# Patient Record
Sex: FEMALE | Race: BLACK OR AFRICAN AMERICAN | NOT HISPANIC OR LATINO | Employment: FULL TIME | URBAN - METROPOLITAN AREA
[De-identification: names, ages, dates, MRNs, and addresses within clinical notes are randomized per-mention and may not be internally consistent; named-entity substitution may affect disease eponyms.]

---

## 2022-08-11 RX ORDER — METHYLPREDNISOLONE 4 MG/1
4 TABLET ORAL
COMMUNITY
Start: 2022-02-14 | End: 2023-02-14

## 2022-08-11 RX ORDER — AZILSARTAN KAMEDOXOMIL 80 MG/1
80 TABLET ORAL DAILY
COMMUNITY
Start: 2022-02-15

## 2022-08-11 RX ORDER — POTASSIUM CHLORIDE 750 MG/1
30 TABLET, EXTENDED RELEASE ORAL 2 TIMES DAILY
COMMUNITY
Start: 2022-02-14 | End: 2023-02-14

## 2022-08-11 RX ORDER — HYDRALAZINE HYDROCHLORIDE 25 MG/1
25 TABLET, FILM COATED ORAL 2 TIMES DAILY
COMMUNITY
Start: 2022-02-14

## 2022-08-11 RX ORDER — CHLORTHALIDONE 25 MG/1
25 TABLET ORAL DAILY
COMMUNITY
Start: 2022-02-14

## 2022-08-11 RX ORDER — CARVEDILOL 12.5 MG/1
12.5 TABLET ORAL 2 TIMES DAILY
COMMUNITY
Start: 2022-02-14

## 2022-08-11 RX ORDER — FUROSEMIDE 20 MG/1
20 TABLET ORAL DAILY
COMMUNITY
Start: 2022-05-17

## 2022-08-11 RX ORDER — CETIRIZINE HYDROCHLORIDE 10 MG/1
10 TABLET ORAL DAILY
COMMUNITY
Start: 2022-03-24 | End: 2023-09-12 | Stop reason: ALTCHOICE

## 2022-08-11 RX ORDER — ALBUTEROL SULFATE 90 UG/1
2 AEROSOL, METERED RESPIRATORY (INHALATION) EVERY 4 HOURS PRN
COMMUNITY
Start: 2022-02-14

## 2022-08-11 RX ORDER — BUDESONIDE AND FORMOTEROL FUMARATE DIHYDRATE 80; 4.5 UG/1; UG/1
2 AEROSOL RESPIRATORY (INHALATION)
COMMUNITY
Start: 2022-02-14

## 2022-08-11 RX ORDER — ALLOPURINOL 100 MG/1
2 TABLET ORAL DAILY
COMMUNITY
Start: 2022-02-14

## 2022-08-11 RX ORDER — ACETAMINOPHEN 500 MG
2000 TABLET ORAL DAILY
COMMUNITY

## 2022-08-30 DIAGNOSIS — N18.31 STAGE 3A CHRONIC KIDNEY DISEASE: Primary | ICD-10-CM

## 2022-09-08 PROBLEM — N18.2 CKD (CHRONIC KIDNEY DISEASE) STAGE 2, GFR 60-89 ML/MIN: Status: ACTIVE | Noted: 2022-09-08

## 2022-09-08 PROBLEM — N25.81 SECONDARY HYPERPARATHYROIDISM OF RENAL ORIGIN: Status: ACTIVE | Noted: 2022-09-08

## 2022-09-08 PROBLEM — I10 ESSENTIAL HYPERTENSION: Status: ACTIVE | Noted: 2022-09-08

## 2022-09-08 PROBLEM — M1A.0720 IDIOPATHIC CHRONIC GOUT OF LEFT FOOT WITHOUT TOPHUS: Status: ACTIVE | Noted: 2022-09-08

## 2022-09-08 NOTE — PROGRESS NOTES
Pt Name:  Michelle Collins  Pt :  1960  Pt MRN:  [id number]      Date:  2022      Reason for visit:     Follow up visit for Chronic Kidney Disease.    Serum creatinine 1.10, GFR 62, CKD stage 2.    Chief Complaint:     The patient denies any complaints today.      Assessment & Plan:      Problem #1.  Essential Hypertension    Assessment:     Reasonably controlled   Plan:     2 g sodium diet, azilsartan 80 mg by mouth daily, carvedilol 12.5 mg by mouth twice daily, furosemide 20 mg by mouth daily, hydralazine 25 mg by mouth twice daily.     Problem #2.   Idiopathic Gout    Assessment:     Asymptomatic   Plan:     Allopurinol 100 mg by mouth daily.     Problem #3.  Chronic Kidney Disease Stage 2    Assessment:     Stable at asymptomatic   Plan:     Conservative, non-dialysis, nephrologic managements.    Continue renal nutrition and fluid managements.    Continue to provide the patient with verbal instruction and reading material regarding the chronic kidney disease condition, and especially its associated cardiovascular risks.    Return for follow-up in 6 months with repeat kidney lab work done before the visit.     Problem #4.  Hypokalemia    Assessment:     Treated and asymptomatic   Plan:     Potassium chloride 30 mEq by mouth twice daily.    Repeat serum potassium prior to the visit in 6 months.       HPI:      This is the most recent [facility] [place] visit, for this 61 year old woman referred by Dr. Aristides Morales on 2013 for further evaluation of kidney function decreased to Chronic Kidney Disease Stage III.     At presentation to the clinic today for follow up of the status of her kidney function, she does not have azotemic symptoms.  Specifically, she does not have absent appetite, nausea, chest pain, shortness of breath, lying flat to sleep at night, absent energy, swelling, or any trouble thinking.          From the standpoint of the risk factors for the development of a  kidney function problem, the patient has a long-standing history of moderately severe hypertension, and idiopathic gout.     History:     Past Medical History:   Diagnosis Date    CKD (chronic kidney disease) stage 3, GFR 30-59 ml/min     Gout, unspecified     HTN (hypertension)     Hypocalcemia     Internal hemorrhoids     Pancreatitis     Personal history of colonic polyps 2012    8 Multiple Hyperplastic polyps, 1 Hyperplastic polyp    Personal history of colonic polyps 2010    14 Hyperplastic polyps, fragment of Adenomatous polyp is also present. 8 Hyperplastic polyps    Personal history of colonic polyps 2017    14 Hyperplastic polyp     Past Surgical History:   Procedure Laterality Date    APPENDECTOMY      BREAST BIOPSY Left     BREAST SURGERY      COLONOSCOPY      2017, 2012, 2010    REDUCTION MAMMAPLASTY      TOTAL HIP ARTHROPLASTY       Family History   Problem Relation Age of Onset    Hypertension Mother     Heart disease Mother     Arthritis Mother     Kidney disease Mother     Hypertension Father     Arthritis Father     Heart disease Father     Hypertension Sister     Arthritis Sister     Arthritis Brother      Social History     Substance and Sexual Activity   Alcohol Use Yes    Alcohol/week: 1.0 standard drink    Types: 1 Glasses of wine per week    Comment: ONCE A WEEK WINE (GLASS)     Social History     Substance and Sexual Activity   Drug Use Not Currently     Social History     Substance and Sexual Activity   Sexual Activity Yes    Partners: Male     reports being sexually active and has had partner(s) who are male.  Social History     Tobacco Use   Smoking Status Former    Types: Cigarettes    Quit date: 2010    Years since quittin.7   Smokeless Tobacco Never       Allergies:    Review of patient's allergies indicates:   Allergen Reactions    Codeine     Sulfa (sulfonamide antibiotics)          Current Outpatient Medications:     allopurinoL (ZYLOPRIM)  "100 MG tablet, Take 2 tablets by mouth once daily., Disp: , Rfl:     azilsartan medoxomiL (EDARBI) 80 mg Tab, Take 80 mg by mouth once daily., Disp: , Rfl:     budesonide-formoterol 80-4.5 mcg (SYMBICORT) 80-4.5 mcg/actuation HFAA, Inhale 2 puffs into the lungs 2 (two) times a day., Disp: , Rfl:     carvediloL (COREG) 12.5 MG tablet, Take 12.5 mg by mouth 2 (two) times a day., Disp: , Rfl:     cetirizine (ZYRTEC) 10 MG tablet, Take 10 mg by mouth once daily., Disp: , Rfl:     chlorthalidone (HYGROTEN) 25 MG Tab, Take 25 mg by mouth once daily., Disp: , Rfl:     cholecalciferol, vitamin D3, (VITAMIN D3) 50 mcg (2,000 unit) Cap capsule, Take 2,000 Units by mouth once daily., Disp: , Rfl:     furosemide (LASIX) 20 MG tablet, Take 20 mg by mouth once daily., Disp: , Rfl:     hydrALAZINE (APRESOLINE) 25 MG tablet, Take 25 mg by mouth 2 (two) times a day., Disp: , Rfl:     potassium chloride SA (K-DUR,KLOR-CON M) 10 MEQ tablet, Take 30 mEq by mouth 2 (two) times a day., Disp: , Rfl:     albuterol (PROVENTIL/VENTOLIN HFA) 90 mcg/actuation inhaler, Inhale 2 puffs into the lungs every 4 (four) hours as needed., Disp: , Rfl:     methylPREDNISolone (MEDROL DOSEPACK) 4 mg tablet, Take 4 mg by mouth., Disp: , Rfl:     ROS:     Constitutional:  Denies fever or chills   Eyes:  Denies change in visual acuity   HENT:  Denies nasal congestion or sore throat   Respiratory:  As in the history of the present illness.   Cardiovascular:  As in the history of the present illness.   GI:  As in the history of the present illness.    Musculoskeletal:  Denies back pain or joint pain   Integument:  Denies rash   Neurologic:  Denies headache, focal weakness or sensory changes   Endocrine:  Denies polyuria or polydipsia   Lymphatic:  Denies swollen glands   Psychiatric:  Denies depression or anxiety    Physical Exam:     Vitals:   Vitals:    09/12/22 1457   BP: (!) 140/88   Pulse: 74   SpO2: 96%   Weight: 102.1 kg (225 lb)   Height: 5' 1" (1.549 " m)     Body mass index is 42.51 kg/m².    Constitutional:  Well developed, well nourished, and in no acute distress   Eyes:  PERRLA, conjunctiva normal   HENT:  Atraumatic, external ears normal, nose normal.  Neck: There is no jugular venous distension or thyromegaly.   Respiratory:  No respiratory distress, normal breath sounds, no rales, no wheezing   Cardiovascular:  Normal rate,and a regular rhythm, no murmurs, no gallops, no rub, and no edema.  GI:  Normal bowel sounds.  Musculoskeletal:  No deformities.   Neurologic:  Alert & oriented x 3, CN 2-12 normal, normal motor function, and no asterixis.   Psychiatric:  Speech and behavior appropriate.      Labs/Tests:    Date:  09/08/2022    Electrolytes (Na/K/Cl/CO2) = 143/3.6/106/32  BUN/Creat/GFR = 16/1.10/62  Ca/Phos/Alb/PTH = 9.6/2.8/4.0/98  U Prot/creat = TLTD  Hgb = 12.5       Follow Up:     Return for follow-up in 6 months with repeat kidney lab work done before the visit.      This note was created using the voice recognition software currently available to the Medical Staff of the [facility] and its health care facilities. All of the best efforts undertaken to edit the product of that use shall necessarily fall short from time to time. Viewers and reviewers of the product of its use are encouraged to contact this provider for clarification when, and if, the product message is unclear.

## 2022-09-12 ENCOUNTER — OFFICE VISIT (OUTPATIENT)
Dept: NEPHROLOGY | Facility: CLINIC | Age: 62
End: 2022-09-12
Payer: COMMERCIAL

## 2022-09-12 VITALS
DIASTOLIC BLOOD PRESSURE: 88 MMHG | SYSTOLIC BLOOD PRESSURE: 140 MMHG | HEIGHT: 61 IN | HEART RATE: 74 BPM | BODY MASS INDEX: 42.48 KG/M2 | WEIGHT: 225 LBS | OXYGEN SATURATION: 96 %

## 2022-09-12 DIAGNOSIS — N18.2 CKD (CHRONIC KIDNEY DISEASE) STAGE 2, GFR 60-89 ML/MIN: Primary | ICD-10-CM

## 2022-09-12 DIAGNOSIS — M1A.0720 IDIOPATHIC CHRONIC GOUT OF LEFT FOOT WITHOUT TOPHUS: ICD-10-CM

## 2022-09-12 DIAGNOSIS — I10 ESSENTIAL HYPERTENSION: ICD-10-CM

## 2022-09-12 DIAGNOSIS — N25.81 SECONDARY HYPERPARATHYROIDISM OF RENAL ORIGIN: ICD-10-CM

## 2022-09-12 PROCEDURE — 99214 OFFICE O/P EST MOD 30 MIN: CPT | Mod: ,,, | Performed by: INTERNAL MEDICINE

## 2022-09-12 PROCEDURE — 99214 PR OFFICE/OUTPT VISIT, EST, LEVL IV, 30-39 MIN: ICD-10-PCS | Mod: ,,, | Performed by: INTERNAL MEDICINE

## 2022-09-12 NOTE — PATIENT INSTRUCTIONS
The patient has been provided with their current level of kidney function including eGFR and creatinine, asked return for follow-up, this time, in 6 months with repeat kidney lab work done before the visit.    She is reminded that she needs to take in 72 oz of fluid a day as a basic intake amount, and needs to increase that is significantly to 80-90 oz a day if she is outdoors in the heat.    In addition, she is reminded that, when taking your blood pressure, she needs to have sat quietly in a quiet place for 5 minutes before checking the blood pressure in order to get the best idea of what her blood pressure control is doing.         We discussed the potential for common complications of CKD including anemia, electrolyte abnormalities, abnormal fluid balance, mineral bone disease and malnutrition.     We discussed strategies to slow the progression of their kidney disease including:  Avoid nephrotoxic agents. Avoid over-the-counter and prescription NSAIDs (Ibuprofren, Motrin, Naproxyn, Aleve, Mobic, Celebrex, Toradol, Advil). All of these can worsen kidney function, elevate BP, cause fluid retention/swelling and elevate potassium. Avoid iodine contrast agents and gadolinium, which can worsen kidney function and/or cause kidney failure. Avoid phosphosoda bowel preps which can worsen kidney function.  Work to improve modifiable risk factors. Aim for good control of blood glucose without episodes of hypoglycemia. Notify the provider managing your diabetes if your blood glucose < 60. Aim for good blood pressure control without episodes of hypotension. Call the office if your systolic blood pressure is consistently < 110. Aim for good control of your cholesterol.  AIC goal <7.0  BP goal <130/80  LDL chol goal <70        Keeping these in goal range will help prevent progression of cardiovascular disease and chronic kidney disease.     We discussed dietary modifications:  Low sodium diet: 2 gm/d or less  Limit/avoid high  potassium foods  Avoid potassium containing salt substitutes  Limit/avoid high phosphorus foods  Limit daily protein intake to 0.8-1 gm/kg of your ideal body weight.     We discussed lifestyle modifications:  Make sure you are drinking plenty of fluids--64 ounces (1/2 gallon) daily  Exercise at least 30 minutes 5 x per week (total 150 minutes per week), example brisk walking  Achieve and maintain a healthy weight (BMI 20-25)  Limit alcohol consumption to <2 drinks per day  Stop smoking  Make sure you stay current on important vaccines-- pneumonia vaccines (Pneumovax and Prevnar), flu vaccine, Hepatitis B (especially patients nearing renal replacement therapy and planning hemodialysis) and Covid-19 vaccine.      Recommendations:  Monitor your BP at home daily and record.  Bring readings to your next appt.  Call the office if your BP is persistently >130/80.  Seek urgent medical attention with signs and symptoms of uremia - extreme weakness, fatigue, confusion, anorexia, metallic taste in mouth, hiccoughs, cramping, itching, chest pain, swelling, or trouble sleeping.

## 2023-03-13 ENCOUNTER — OFFICE VISIT (OUTPATIENT)
Dept: NEPHROLOGY | Facility: CLINIC | Age: 63
End: 2023-03-13
Payer: COMMERCIAL

## 2023-03-13 VITALS
HEIGHT: 63 IN | OXYGEN SATURATION: 99 % | SYSTOLIC BLOOD PRESSURE: 151 MMHG | DIASTOLIC BLOOD PRESSURE: 79 MMHG | BODY MASS INDEX: 37.21 KG/M2 | WEIGHT: 210 LBS | HEART RATE: 81 BPM

## 2023-03-13 DIAGNOSIS — N25.81 SECONDARY HYPERPARATHYROIDISM OF RENAL ORIGIN: ICD-10-CM

## 2023-03-13 DIAGNOSIS — M1A.0720 IDIOPATHIC CHRONIC GOUT OF LEFT FOOT WITHOUT TOPHUS: ICD-10-CM

## 2023-03-13 DIAGNOSIS — N18.2 CKD (CHRONIC KIDNEY DISEASE) STAGE 2, GFR 60-89 ML/MIN: Primary | ICD-10-CM

## 2023-03-13 DIAGNOSIS — I10 ESSENTIAL HYPERTENSION: ICD-10-CM

## 2023-03-13 PROCEDURE — 99213 OFFICE O/P EST LOW 20 MIN: CPT | Mod: S$GLB,,, | Performed by: INTERNAL MEDICINE

## 2023-03-13 PROCEDURE — 99213 PR OFFICE/OUTPT VISIT, EST, LEVL III, 20-29 MIN: ICD-10-PCS | Mod: S$GLB,,, | Performed by: INTERNAL MEDICINE

## 2023-03-13 RX ORDER — SEMAGLUTIDE 1.34 MG/ML
INJECTION, SOLUTION SUBCUTANEOUS WEEKLY
COMMUNITY
Start: 2022-09-16 | End: 2023-09-12 | Stop reason: ALTCHOICE

## 2023-03-13 NOTE — PROGRESS NOTES
Pt Name:  Michelle Collins  Pt :  1960  Pt MRN:  [id number]      Date:  2023      Reason for visit:     Follow up visit for Chronic Kidney Disease.    Serum creatinine 1.15, GFR 35, CKD stage 3a.    Chief Complaint:     The patient denies any complaints today.      Assessment & Plan:      Problem #1.  Essential Hypertension    Assessment:     Reasonably controlled   Plan:     2 g sodium diet, azilsartan 80 mg by mouth daily, carvedilol 12.5 mg by mouth twice daily, furosemide 20 mg by mouth daily, hydralazine 25 mg by mouth twice daily.     Problem #2.   Idiopathic Gout    Assessment:     Asymptomatic   Plan:     Allopurinol 100 mg by mouth daily.     Problem #3.  Chronic Kidney Disease Stage 2    Assessment:     Stable at asymptomatic   Plan:     Conservative, non-dialysis, nephrologic managements.    Continue renal nutrition and fluid managements.    Continue to provide the patient with verbal instruction and reading material regarding the chronic kidney disease condition, and especially its associated cardiovascular risks.    Return for follow-up in 6 months with repeat kidney lab work done before the visit.     Problem #4.  Secondary Hyperparathyroidism of Renal Origin    Assessment:     Asymptomatic and the PTH less than 150 so that beginning specialized vitamin D treatment is not indicated at this time.     Plan:    Repeat calcium, phosphorus, albumin, and intact PTH level prior to the visit in 6 months.       Problem #4.  Hypokalemia    Assessment:     Treated and asymptomatic   Plan:     Potassium chloride 30 mEq by mouth twice daily.    Repeat serum potassium prior to the visit in 6 months.       HPI:      This is the most recent [facility] [place] visit, for this 62 year old woman referred by Dr. Aristides Morales on 2013 for further evaluation of kidney function decreased to Chronic Kidney Disease Stage III.     In the clinic today for follow up of the status of her kidney  function, she does not have absent appetite, nausea, chest pain, shortness of breath, lying flat to sleep at night, absent energy, swelling, or any trouble thinking.          From the standpoint of the risk factors for the development of a kidney function problem, the patient has a long-standing history of moderately severe hypertension, and idiopathic gout.     History:     Past Medical History:   Diagnosis Date    CKD (chronic kidney disease) stage 3, GFR 30-59 ml/min     Gout, unspecified     HTN (hypertension)     Hypocalcemia     Internal hemorrhoids     Pancreatitis     Personal history of colonic polyps 04/16/2012    8 Multiple Hyperplastic polyps, 1 Hyperplastic polyp    Personal history of colonic polyps 04/12/2010    14 Hyperplastic polyps, fragment of Adenomatous polyp is also present. 8 Hyperplastic polyps    Personal history of colonic polyps 07/21/2017    14 Hyperplastic polyp     Past Surgical History:   Procedure Laterality Date    APPENDECTOMY      BREAST BIOPSY Left     BREAST SURGERY      COLONOSCOPY      7/21/2017, 4/16/2012, 4/12/2010    REDUCTION MAMMAPLASTY      TOTAL HIP ARTHROPLASTY       Family History   Problem Relation Age of Onset    Hypertension Mother     Heart disease Mother     Arthritis Mother     Kidney disease Mother     Hypertension Father     Arthritis Father     Heart disease Father     Hypertension Sister     Arthritis Sister     Arthritis Brother      Social History     Substance and Sexual Activity   Alcohol Use Yes    Alcohol/week: 1.0 standard drink    Types: 1 Glasses of wine per week    Comment: ONCE A WEEK WINE (GLASS)     Social History     Substance and Sexual Activity   Drug Use Not Currently     Social History     Substance and Sexual Activity   Sexual Activity Yes    Partners: Male     reports being sexually active and has had partner(s) who are male.  Social History     Tobacco Use   Smoking Status Former    Types: Cigarettes    Quit date: 1/1/2010    Years since  quittin.2   Smokeless Tobacco Never       Allergies:    Review of patient's allergies indicates:   Allergen Reactions    Codeine     Sulfa (sulfonamide antibiotics)          Current Outpatient Medications:     albuterol (PROVENTIL/VENTOLIN HFA) 90 mcg/actuation inhaler, Inhale 2 puffs into the lungs every 4 (four) hours as needed., Disp: , Rfl:     allopurinoL (ZYLOPRIM) 100 MG tablet, Take 2 tablets by mouth once daily., Disp: , Rfl:     azilsartan medoxomiL (EDARBI) 80 mg Tab, Take 80 mg by mouth once daily., Disp: , Rfl:     budesonide-formoterol 80-4.5 mcg (SYMBICORT) 80-4.5 mcg/actuation HFAA, Inhale 2 puffs into the lungs as needed., Disp: , Rfl:     carvediloL (COREG) 12.5 MG tablet, Take 12.5 mg by mouth 2 (two) times a day., Disp: , Rfl:     chlorthalidone (HYGROTEN) 25 MG Tab, Take 25 mg by mouth once daily., Disp: , Rfl:     cholecalciferol, vitamin D3, (VITAMIN D3) 50 mcg (2,000 unit) Cap capsule, Take 2,000 Units by mouth once daily., Disp: , Rfl:     furosemide (LASIX) 20 MG tablet, Take 20 mg by mouth once daily., Disp: , Rfl:     hydrALAZINE (APRESOLINE) 25 MG tablet, Take 25 mg by mouth 2 (two) times a day., Disp: , Rfl:     cetirizine (ZYRTEC) 10 MG tablet, Take 10 mg by mouth once daily., Disp: , Rfl:     OZEMPIC 0.25 mg or 0.5 mg(2 mg/1.5 mL) pen injector, Inject into the skin once a week., Disp: , Rfl:     ROS:     Constitutional:  Denies fever or chills   Eyes:  Denies change in visual acuity   HENT:  Denies nasal congestion or sore throat   Respiratory:  As in the history of the present illness.   Cardiovascular:  As in the history of the present illness.   GI:  As in the history of the present illness.    Musculoskeletal:  Denies back pain or joint pain   Integument:  Denies rash   Neurologic:  Denies headache, focal weakness or sensory changes   Endocrine:  Denies polyuria or polydipsia   Lymphatic:  Denies swollen glands   Psychiatric:  Denies depression or anxiety    Physical Exam:  "    Vitals:   Vitals:    03/13/23 1536   BP: (!) 151/79   Pulse: 81   SpO2: 99%   Weight: 95.3 kg (210 lb)   Height: 5' 3" (1.6 m)   PainSc: 0-No pain     Body mass index is 37.2 kg/m².    Constitutional:  Well developed, well nourished, and in no acute distress   Eyes:  PERRLA, conjunctiva normal   HENT:  Atraumatic, external ears normal, nose normal.  Neck: There is no jugular venous distension or thyromegaly.   Respiratory:  No respiratory distress, normal breath sounds, no rales, no wheezing   Cardiovascular:  Normal rate,and a regular rhythm, no murmurs, no gallops, no rub, and no edema.  GI:  Normal bowel sounds.  Musculoskeletal:  No deformities.   Neurologic:  Alert & oriented x 3, CN 2-12 normal, normal motor function, and no asterixis.   Psychiatric:  Speech and behavior appropriate.      Labs/Tests:    Date:  03/09/2023    Na/K/Cl/CO2 = 144/3.5/103/34  BUN/Creat/GFR = 29/1.15/53  Ca/Phos/Alb/PTH = 10.1/3.0/4.2/71  U Prot/creat = TLTD  Hgb = 13.8       Follow Up:     Return for follow-up in 6 months with repeat kidney lab work done before the visit.      This note was created using the voice recognition software currently available to the Medical Staff of the [facility] and its health care facilities. All of the best efforts undertaken to edit the product of that use shall necessarily fall short from time to time. Viewers and reviewers of the product of its use are encouraged to contact this provider for clarification when, and if, the product message is unclear.        "

## 2023-03-13 NOTE — PATIENT INSTRUCTIONS
The patient has been provided with her current level of somewhat decreased kidney function today, and she is asked to return for follow-up, this time, in 6 months with repeat kidney lab work done before the visit.    Meantime, she is reminded take in at least 72 oz of fluid a day and even more if she is outdoors in the heat.

## 2023-09-12 ENCOUNTER — OFFICE VISIT (OUTPATIENT)
Dept: NEPHROLOGY | Facility: CLINIC | Age: 63
End: 2023-09-12
Payer: COMMERCIAL

## 2023-09-12 VITALS
HEART RATE: 76 BPM | SYSTOLIC BLOOD PRESSURE: 139 MMHG | HEIGHT: 63 IN | WEIGHT: 216 LBS | BODY MASS INDEX: 38.27 KG/M2 | DIASTOLIC BLOOD PRESSURE: 83 MMHG | OXYGEN SATURATION: 96 %

## 2023-09-12 DIAGNOSIS — I10 ESSENTIAL HYPERTENSION: ICD-10-CM

## 2023-09-12 DIAGNOSIS — N25.81 SECONDARY HYPERPARATHYROIDISM OF RENAL ORIGIN: ICD-10-CM

## 2023-09-12 DIAGNOSIS — N18.2 CKD (CHRONIC KIDNEY DISEASE) STAGE 2, GFR 60-89 ML/MIN: Primary | ICD-10-CM

## 2023-09-12 DIAGNOSIS — M1A.0720 IDIOPATHIC CHRONIC GOUT OF LEFT FOOT WITHOUT TOPHUS: ICD-10-CM

## 2023-09-12 PROCEDURE — 99213 OFFICE O/P EST LOW 20 MIN: CPT | Mod: S$GLB,,, | Performed by: INTERNAL MEDICINE

## 2023-09-12 PROCEDURE — 99213 PR OFFICE/OUTPT VISIT, EST, LEVL III, 20-29 MIN: ICD-10-PCS | Mod: S$GLB,,, | Performed by: INTERNAL MEDICINE

## 2023-09-12 NOTE — PROGRESS NOTES
Pt Name:  Michelle Collins  Pt :  1960  Pt MRN:  [id number]      Date:  2023      Reason for visit:     Follow up visit for Chronic Kidney Disease.    Serum creatinine 1.17, GFR 52, CKD stage 3a.    Chief Complaint:     The patient denies any complaints today.      Assessment & Plan:      Problem #1.  Essential Hypertension    Assessment:     Reasonably controlled   Plan:     2 g sodium diet, azilsartan 80 mg by mouth daily, carvedilol 12.5 mg by mouth twice daily, furosemide 20 mg by mouth daily, hydralazine 25 mg by mouth twice daily.     Problem #2.   Idiopathic Gout    Assessment:     Asymptomatic   Plan:     Allopurinol 100 mg by mouth daily.     Problem #3.  Chronic Kidney Disease Stage 2    Assessment:     Stable at asymptomatic   Plan:     Conservative, non-dialysis, nephrologic managements.    Continue renal nutrition and fluid managements.    Continue to provide the patient with verbal instruction and reading material regarding the chronic kidney disease condition, and especially its associated cardiovascular risks.    Return for follow-up in 6 months with repeat kidney lab work done before the visit.     Problem #4.  Secondary Hyperparathyroidism of Renal Origin    Assessment:     Asymptomatic and the PTH less than 150 so that beginning specialized vitamin D treatment is not indicated at this time.     Plan:    Repeat calcium, phosphorus, albumin, and intact PTH level prior to the visit in 6 months.       Problem #4.  Hypokalemia    Assessment:     Treated and asymptomatic   Plan:     Potassium chloride 30 mEq by mouth twice daily.    Repeat serum potassium prior to the visit in 6 months.       HPI:      This is the most recent [facility] visit, for this 62 year old woman referred by Dr. Aristides Morales on 2013 for further evaluation of kidney function decreased to Chronic Kidney Disease Stage III.     At presentation to the clinic today for follow up of the status of  her kidney function, she does not have azotemic symptoms. Specifically, she does not have absent appetite, nausea, chest pain, shortness of breath, lying flat to sleep at night, absent energy, swelling, or any trouble thinking.          From the standpoint of the risk factors for the development of a kidney function problem, the patient has a long-standing history of moderately severe hypertension, and idiopathic gout.     History:     Past Medical History:   Diagnosis Date    CKD (chronic kidney disease) stage 3, GFR 30-59 ml/min     Gout, unspecified     HTN (hypertension)     Hypocalcemia     Internal hemorrhoids     Pancreatitis     Personal history of colonic polyps 04/16/2012    8 Multiple Hyperplastic polyps, 1 Hyperplastic polyp    Personal history of colonic polyps 04/12/2010    14 Hyperplastic polyps, fragment of Adenomatous polyp is also present. 8 Hyperplastic polyps    Personal history of colonic polyps 07/21/2017    14 Hyperplastic polyp     Past Surgical History:   Procedure Laterality Date    APPENDECTOMY      BREAST BIOPSY Left     BREAST SURGERY      COLONOSCOPY      7/21/2017, 4/16/2012, 4/12/2010    REDUCTION MAMMAPLASTY      TOTAL HIP ARTHROPLASTY       Family History   Problem Relation Age of Onset    Hypertension Mother     Heart disease Mother     Arthritis Mother     Kidney disease Mother     Hypertension Father     Arthritis Father     Heart disease Father     Hypertension Sister     Arthritis Sister     Arthritis Brother      Social History     Substance and Sexual Activity   Alcohol Use Yes    Alcohol/week: 1.0 standard drink of alcohol    Types: 1 Glasses of wine per week    Comment: ONCE A WEEK WINE (GLASS)     Social History     Substance and Sexual Activity   Drug Use Not Currently     Social History     Substance and Sexual Activity   Sexual Activity Yes    Partners: Male     reports being sexually active and has had partner(s) who are male.  Social History     Tobacco Use   Smoking  Status Former    Current packs/day: 0.00    Types: Cigarettes    Quit date: 2010    Years since quittin.7   Smokeless Tobacco Never       Allergies:    Review of patient's allergies indicates:   Allergen Reactions    Codeine     Sulfa (sulfonamide antibiotics)          Current Outpatient Medications:     albuterol (PROVENTIL/VENTOLIN HFA) 90 mcg/actuation inhaler, Inhale 2 puffs into the lungs every 4 (four) hours as needed., Disp: , Rfl:     allopurinoL (ZYLOPRIM) 100 MG tablet, Take 2 tablets by mouth once daily., Disp: , Rfl:     azilsartan medoxomiL (EDARBI) 80 mg Tab, Take 80 mg by mouth once daily., Disp: , Rfl:     budesonide-formoterol 80-4.5 mcg (SYMBICORT) 80-4.5 mcg/actuation HFAA, Inhale 2 puffs into the lungs as needed., Disp: , Rfl:     carvediloL (COREG) 12.5 MG tablet, Take 12.5 mg by mouth 2 (two) times a day., Disp: , Rfl:     chlorthalidone (HYGROTEN) 25 MG Tab, Take 25 mg by mouth once daily., Disp: , Rfl:     cholecalciferol, vitamin D3, (VITAMIN D3) 50 mcg (2,000 unit) Cap capsule, Take 2,000 Units by mouth once daily., Disp: , Rfl:     furosemide (LASIX) 20 MG tablet, Take 20 mg by mouth once daily., Disp: , Rfl:     hydrALAZINE (APRESOLINE) 25 MG tablet, Take 25 mg by mouth 2 (two) times a day., Disp: , Rfl:     cetirizine (ZYRTEC) 10 MG tablet, Take 10 mg by mouth once daily., Disp: , Rfl:     OZEMPIC 0.25 mg or 0.5 mg(2 mg/1.5 mL) pen injector, Inject into the skin once a week., Disp: , Rfl:     ROS:     Constitutional:  Denies fever or chills   Eyes:  Denies change in visual acuity   HENT:  Denies nasal congestion or sore throat   Respiratory:  As in the history of the present illness.   Cardiovascular:  As in the history of the present illness.   GI:  As in the history of the present illness.    Musculoskeletal:  Denies back pain or joint pain   Integument:  Denies rash   Neurologic:  Denies headache, focal weakness or sensory changes   Endocrine:  Denies polyuria or polydipsia  "  Lymphatic:  Denies swollen glands   Psychiatric:  Denies depression or anxiety    Physical Exam:     Vitals:   Vitals:    09/12/23 1536   BP: 139/83   Pulse: 76   SpO2: 96%   Weight: 98 kg (216 lb)   Height: 5' 3" (1.6 m)   PainSc: 0-No pain     Body mass index is 38.26 kg/m².    Constitutional:  Well developed, well nourished, and in no acute distress   Eyes:  PERRLA, conjunctiva normal   HENT:  Atraumatic, external ears normal, nose normal.  Neck: There is no jugular venous distension or thyromegaly.   Respiratory:  No respiratory distress, normal breath sounds, no rales, no wheezing   Cardiovascular:  Normal rate,and a regular rhythm, no murmurs, no gallops, no rub, and no edema.  GI:  Normal bowel sounds.  Musculoskeletal:  No deformities.   Neurologic:  Alert & oriented x 3, CN 2-12 normal, normal motor function, and no asterixis.   Psychiatric:  Speech and behavior appropriate.      Labs/Tests:    Date:  09/11/2023    Na/K/Cl/CO2 = 141/3.3/105/30  BUN/Creat/GFR = 20/1.15/53  Ca/Phos/Alb/PTH = 9.8/3.3/4.1/83  U Prot/creat = 0.1  Hgb = 12.8       Follow Up:     Return for follow-up in 6 months with repeat kidney lab work done before the visit.      This note was created using the voice recognition software currently available to the Medical Staff of the [facility] and its health care facilities. All of the best efforts undertaken to edit the product of that use shall necessarily fall short from time to time. Viewers and reviewers of the product of its use are encouraged to contact this provider for clarification when, and if, the product message is unclear.        "

## 2023-09-12 NOTE — PATIENT INSTRUCTIONS
The patient has been provided with her current level of kidney function and she is asked to return for follow-up in 6 months with repeat kidney lab work done before the visit.    Meanwhile, she is reminded to make best effort at taking in 72 oz of fluid a day in order to avoid development of dehydration and a decrease in kidney function as a consequence.